# Patient Record
Sex: FEMALE | Race: BLACK OR AFRICAN AMERICAN
[De-identification: names, ages, dates, MRNs, and addresses within clinical notes are randomized per-mention and may not be internally consistent; named-entity substitution may affect disease eponyms.]

---

## 2020-11-16 ENCOUNTER — HOSPITAL ENCOUNTER (OUTPATIENT)
Dept: HOSPITAL 62 - CCL | Age: 40
Discharge: HOME | End: 2020-11-16
Attending: SURGERY
Payer: SELF-PAY

## 2020-11-16 VITALS — SYSTOLIC BLOOD PRESSURE: 142 MMHG | DIASTOLIC BLOOD PRESSURE: 96 MMHG

## 2020-11-16 DIAGNOSIS — Z03.818: ICD-10-CM

## 2020-11-16 DIAGNOSIS — C50.911: Primary | ICD-10-CM

## 2020-11-16 DIAGNOSIS — F17.200: ICD-10-CM

## 2020-11-16 DIAGNOSIS — Z80.6: ICD-10-CM

## 2020-11-16 LAB
ADD MANUAL DIFF: NO
BASOPHILS # BLD AUTO: 0 10^3/UL (ref 0–0.2)
BASOPHILS NFR BLD AUTO: 0.4 % (ref 0–2)
EOSINOPHIL # BLD AUTO: 0.1 10^3/UL (ref 0–0.6)
EOSINOPHIL NFR BLD AUTO: 3.7 % (ref 0–6)
ERYTHROCYTE [DISTWIDTH] IN BLOOD BY AUTOMATED COUNT: 14.1 % (ref 11.5–14)
HCT VFR BLD CALC: 37.8 % (ref 36–47)
HGB BLD-MCNC: 12.6 G/DL (ref 12–15.5)
LYMPHOCYTES # BLD AUTO: 1.2 10^3/UL (ref 0.5–4.7)
LYMPHOCYTES NFR BLD AUTO: 34.2 % (ref 13–45)
MCH RBC QN AUTO: 30.7 PG (ref 27–33.4)
MCHC RBC AUTO-ENTMCNC: 33.4 G/DL (ref 32–36)
MCV RBC AUTO: 92 FL (ref 80–97)
MONOCYTES # BLD AUTO: 0.3 10^3/UL (ref 0.1–1.4)
MONOCYTES NFR BLD AUTO: 8.5 % (ref 3–13)
NEUTROPHILS # BLD AUTO: 1.8 10^3/UL (ref 1.7–8.2)
NEUTS SEG NFR BLD AUTO: 53.2 % (ref 42–78)
PLATELET # BLD: 240 10^3/UL (ref 150–450)
RBC # BLD AUTO: 4.1 10^6/UL (ref 3.72–5.28)
TOTAL CELLS COUNTED % (AUTO): 100 %
WBC # BLD AUTO: 3.4 10^3/UL (ref 4–10.5)

## 2020-11-16 PROCEDURE — C9803 HOPD COVID-19 SPEC COLLECT: HCPCS

## 2020-11-16 PROCEDURE — C1752 CATH,HEMODIALYSIS,SHORT-TERM: HCPCS

## 2020-11-16 PROCEDURE — 84703 CHORIONIC GONADOTROPIN ASSAY: CPT

## 2020-11-16 PROCEDURE — 85025 COMPLETE CBC W/AUTO DIFF WBC: CPT

## 2020-11-16 PROCEDURE — 36561 INSERT TUNNELED CV CATH: CPT

## 2020-11-16 PROCEDURE — 87635 SARS-COV-2 COVID-19 AMP PRB: CPT

## 2020-11-16 PROCEDURE — 77001 FLUOROGUIDE FOR VEIN DEVICE: CPT

## 2020-11-16 PROCEDURE — 36415 COLL VENOUS BLD VENIPUNCTURE: CPT

## 2020-11-16 PROCEDURE — 76937 US GUIDE VASCULAR ACCESS: CPT

## 2020-11-16 NOTE — OPERATIVE REPORT
Operative Report


DATE OF SURGERY: 11/16/20


PREOPERATIVE DIAGNOSIS: Locally advanced right breast carcinoma


POSTOPERATIVE DIAGNOSIS: Same


OPERATION: 1.  Focused ultrasound left neck, with ultrasound directed insertion 

of Dhliqp-k-Lpga catheter, left internal jugular vein.  2.  Port placement to 

the left subclavian position.  3.  Interpretation of intraoperative fluoroscopy.


SURGEON: CHRISTY JENNINGS


ANESTHESIA: Moderate Sedation


TISSUE REMOVED OR ALTERED: None


COMPLICATIONS: 





None


ESTIMATED BLOOD LOSS: Scant


INTRAOPERATIVE FINDINGS: See below


PROCEDURE: 





Patient was taken from the ambulatory area to the main cardiac catheterization 

lab where she was placed in supine position arms tucked, left neck and chest 

wall prepped and draped in sterile fashion.





Surgical plan and surgical timeout were conducted.





Left neck was scanned with a variable frequency linear transducer.  Appropriate 

level of conscious sedation was induced.  There is significant for a patent, 

compressible left internal jugular vein.  Skin was anesthetized with 1% plain 

lidocaine, nick made with a 15 blade, micro needle and wire threaded into the 

left internal jugular vein without difficulty.





A suitable site for placement of the port was chosen left subclavian position.  

Skin was anesthetized with 1% plain lidocaine.  A 3 and half centimeter incision

was made, parallel to the left clavicle, subcutaneous pocket developed with 

electrocautery, and single lumen catheter trimmed to the appropriate length, 

tunnel between the 2 incisions, then attached to the port with the plastic 

retention ring.  The port was tucked into the left subclavian pocket.





Under fluoroscopic guidance, the microwire was switched over to a conventional 

guidewire, 0.030 inches.  The 8 Icelandic dilator introducer sheath was threaded 

over the wire, dilator and introducer removed, and free catheter fragment 

threaded into the left internal jugular vein.  The tip of the catheter was 

advanced to the right atrium, and the strip away sheath removed.  There was no 

kinking of the catheter at the neck.  The catheter was aspirated and flushed 

with heparinized saline using a Mckinney needle.





There is no mechanical bleeding.  Sponge and counts are correct.  All wounds 

closed with 3-0 Vicryl, benzoin and Steri-Strips.





Documentation of the left internal jugular vein by ultrasound, and completion 

fluoroscopy retained in the patient's record by the radiology services  team

## 2020-11-16 NOTE — DISCHARGE SUMMARY
Discharge Summary (SDC)





- Discharge


Final Diagnosis: 





Locally advanced right breast carcinoma


Date of Surgery: 11/16/20


Discharge Date: 11/16/20


Condition: Good


Treatment or Instructions: 


May use port; may shower in 48 hours; resume preoperative medications, diet, 

activity: Prescription added electronically; patient to follow-up with [place] 

surgical clinic in 2 weeks


Prescriptions: 


Ketorolac Tromethamine [Toradol 10 mg Tablet] 10 mg PO Q6HP PRN #14 tablet


 PRN Reason: 


Referrals: 


YENNY CHRISTIAN MD [Primary Care Provider] - 


Discharge Diet: As Tolerated


Discharge Activity: Activity As Tolerated


Home Care Assistance: None Needed


Report the Following to Your Physician Immediately: Shortness of Breath, 

Increase in Pain, Fever over 101 Degrees

## 2020-11-17 ENCOUNTER — HOSPITAL ENCOUNTER (OUTPATIENT)
Dept: HOSPITAL 62 - RAD | Age: 40
End: 2020-11-17
Attending: INTERNAL MEDICINE
Payer: SELF-PAY

## 2020-11-17 DIAGNOSIS — K71.0: Primary | ICD-10-CM

## 2020-11-17 DIAGNOSIS — C50.411: ICD-10-CM

## 2020-11-17 PROCEDURE — 78306 BONE IMAGING WHOLE BODY: CPT

## 2020-11-17 PROCEDURE — A9503 TC99M MEDRONATE: HCPCS

## 2020-11-17 PROCEDURE — 74177 CT ABD & PELVIS W/CONTRAST: CPT

## 2020-11-17 PROCEDURE — 71260 CT THORAX DX C+: CPT

## 2020-11-17 NOTE — RADIOLOGY REPORT (SQ)
EXAM DESCRIPTION:  CT CHEST WITH; CT ABD/PELVIS WITH IV   ORAL



IMAGES COMPLETED DATE/TIME:  11/17/2020 9:21 am



REASON FOR STUDY:  C50.411 MALIG NEOPLM OF UPPER-OUTER QUADRANT OF RIGHT FEMALE BREAST C50.411  MALIG
 NEOPLM OF UPPER-OUTER QUADRANT OF RIGHT FEMALE



CONTRAST TYPE AND DOSE:  contrast/concentration: Isovue 350.00 mmol/ml; Total Contrast Delivered: 100
.0 ml; Total Saline Delivered: 72.0 ml



RENAL FUNCTION:  None required. The patient is less than 50 years old.



COMPARISON:  None.



TECHNIQUE:  CT scan of the chest performed using helical scanning technique with dynamic intravenous 
contrast injection.  Images reviewed with lung, soft tissue and bone windows. Reconstructed coronal a
nd sagittal MPR images reviewed.  All images stored on PACS.

All CT scanners at this facility use dose modulation, iterative reconstruction, and/or weight based d
osing when appropriate to reduce radiation dose to as low as reasonably achievable (ALARA).

[facility]: Dose Right  [facility]: CareDose    [facility]: Dose Right    [facility]: Teradose 4D    [facility]: Smart Technologies



RADIATION DOSE:  CT Rad equipment meets quality standard of care and radiation dose reduction techniq
ues were employed. CTDIvol: 11.4 - 16.6 mGy. DLP: 1993 mGy-cm. .



LIMITATIONS:  None.



FINDINGS:  AXILLAE: No adenopathy.

CHEST WALL: No masses.  No subcutaneous air.

LUNGS: 50% left pneumothorax.  No infiltrate, effusion, or mass.

PLEURA: No effusions.  No calcifications.

THYROID: No masses or significant asymmetry.

HILAR AND MEDIASTINAL STRUCTURES: No identified masses or abnormal nodes.

AORTA AND GREAT VESSELS: No aneurysm.  No dissection.

PULMONARY ARTERIES: No identified pulmonary emboli.  Study not optimized for the pulmonary arteries.

HEART: No pericardial effusion.

HARDWARE AND LIFELINES: Injection port on the left.

BONES: No significant finding.

OTHER: No other significant finding.



IMPRESSION:  50% left pneumothorax.  No evidence of metastatic disease in the thorax.



COMPARISON:  None.



RADIATION DOSE:  CT Rad equipment meets quality standard of care and radiation dose reduction techniq
ues were employed. CTDIvol: 11.4 - 16.6 mGy. DLP: 1993 mGy-cm. mGy.



TECHNIQUE:  CT scan of the abdomen and pelvis performed with intravenous and oral contrast using heaven
rossana scanning technique with dynamic intravenous contrast injection.  Images reviewed with lung, soft 
tissue and bone windows.  Reconstructed coronal and sagittal MPR images reviewed.  Delayed images for
 evaluation of the urinary system also acquired and evaluated. All images stored on PACS.

All CT scanners at this facility use dose modulation, iterative reconstruction, and/or weight based d
osing when appropriate to reduce radiation dose to as low as reasonably achievable (ALARA).

[facility]: Dose Right  [facility]: SureCare    [facility]: Dose Right    [facility]: Teradose 4D    [facility]: Smart Technologies



FINDINGS:  LIVER: There is a 12 mm low-density lesion in the right lobe of the liver.  No other signi
ficant findings.

SPLEEN: Normal size.  No focal lesions.

PANCREAS: No masses.  No significant calcifications.  No adjacent inflammation or peripancreatic flui
d collections.  Pancreatic duct not dilated.

GALLBLADDER: No identified stones by CT criteria. No inflammatory changes to suggest cholecystitis.

ADRENAL GLANDS: No significant masses or asymmetry.

RIGHT KIDNEY AND URETER: No solid masses.   No significant calcifications.   No hydronephrosis or hyd
roureter.

LEFT KIDNEY AND URETER: No solid masses.   No significant calcifications.   No hydronephrosis or hydr
oureter.

AORTA AND VESSELS: No aneurysm. No dissection. Renal arteries, SMA, celiac without stenosis.

RETROPERITONEUM: No retroperitoneal adenopathy, hemorrhage or masses.

LARGE AND SMALL BOWEL: No dilatation.  No masses.  No wall thickening.

APPENDIX: Normal.

ABDOMINAL WALL: No hernia or masses.

PERITONEAL CAVITY: No free air.  No free fluid.  No peritoneal implants or masses.

PELVIS: No mass or free fluid.  Normal bladder.

BONES: No significant or acute findings.

OTHER: No other significant finding.



IMPRESSION:  There is no evidence of abdominal or pelvic metastases.  There is a low-density lesion i
n the right lobe of the liver that likely represents a cyst or hemangioma.



COMMENT:    Pertinent findings on the imaging study reported as a CRITICAL RESULT to YENNY CHRISTIAN MD 
at18:34 on 11/17/2020.

Category of Critical Result: Pneumothorax



TECHNICAL DOCUMENTATION:  JOB ID:  [id number]

Quality ID # 436: Final reports with documentation of one or more dose reduction techniques (e.g., Au
tomated exposure control, adjustment of the mA and/or kV according to patient size, use of iterative 
reconstruction technique)

 2011 [employer]- All Rights Reserved



Reading location - IP/workstation name: [email or web address]

## 2020-11-17 NOTE — RADIOLOGY REPORT (SQ)
EXAM DESCRIPTION:  PORTACATH INSERTION



IMAGES COMPLETED DATE/TIME:  11/16/2020 11:36 am



REASON FOR STUDY:  RT BREAST CA C50.911  MALIGNANT NEOPLASM OF UNSP SITE OF RIGHT FEMALE MICHAEL



COMPARISON:  None.



FLUOROSCOPY TIME:  Less than 0.1 minutes.

Cine fluoroscopic images saved to PACS.



TECHNIQUE:  Intra-operative images acquired during surgical procedure to evaluate progress.

NUMBER OF IMAGES: Cine fluoroscopic images.



LIMITATIONS:  None.



FINDINGS:  Images of the chest acquired during port placement.



IMPRESSION:  IMAGE(S) OBTAINED DURING PROCEDURE.



COMMENT:  Quality :  Final reports for procedures using fluoroscopy that document radiation exp
osure indices, or exposure time and number of fluorographic images (if radiation exposure indices are
 not available)

Please consult full operative report of the attending physician for description of the procedure.



TECHNICAL DOCUMENTATION:  JOB ID:  [id number]

 2011 [employer]- All Rights Reserved



Reading location - IP/workstation name: [email or web address]

## 2020-11-17 NOTE — RADIOLOGY REPORT (SQ)
EXAM DESCRIPTION:  CT CHEST WITH; CT ABD/PELVIS WITH IV   ORAL



IMAGES COMPLETED DATE/TIME:  11/17/2020 9:21 am



REASON FOR STUDY:  C50.411 MALIG NEOPLM OF UPPER-OUTER QUADRANT OF RIGHT FEMALE BREAST C50.411  MALIG
 NEOPLM OF UPPER-OUTER QUADRANT OF RIGHT FEMALE



CONTRAST TYPE AND DOSE:  contrast/concentration: Isovue 350.00 mmol/ml; Total Contrast Delivered: 100
.0 ml; Total Saline Delivered: 72.0 ml



RENAL FUNCTION:  None required. The patient is less than 50 years old.



COMPARISON:  None.



TECHNIQUE:  CT scan of the chest performed using helical scanning technique with dynamic intravenous 
contrast injection.  Images reviewed with lung, soft tissue and bone windows. Reconstructed coronal a
nd sagittal MPR images reviewed.  All images stored on PACS.

All CT scanners at this facility use dose modulation, iterative reconstruction, and/or weight based d
osing when appropriate to reduce radiation dose to as low as reasonably achievable (ALARA).

[facility]: Dose Right  [facility]: CareDose    [facility]: Dose Right    [facility]: Teradose 4D    [facility]: Smart Technologies



RADIATION DOSE:  CT Rad equipment meets quality standard of care and radiation dose reduction techniq
ues were employed. CTDIvol: 11.4 - 16.6 mGy. DLP: 1993 mGy-cm. .



LIMITATIONS:  None.



FINDINGS:  AXILLAE: No adenopathy.

CHEST WALL: No masses.  No subcutaneous air.

LUNGS: 50% left pneumothorax.  No infiltrate, effusion, or mass.

PLEURA: No effusions.  No calcifications.

THYROID: No masses or significant asymmetry.

HILAR AND MEDIASTINAL STRUCTURES: No identified masses or abnormal nodes.

AORTA AND GREAT VESSELS: No aneurysm.  No dissection.

PULMONARY ARTERIES: No identified pulmonary emboli.  Study not optimized for the pulmonary arteries.

HEART: No pericardial effusion.

HARDWARE AND LIFELINES: Injection port on the left.

BONES: No significant finding.

OTHER: No other significant finding.



IMPRESSION:  50% left pneumothorax.  No evidence of metastatic disease in the thorax.



COMPARISON:  None.



RADIATION DOSE:  CT Rad equipment meets quality standard of care and radiation dose reduction techniq
ues were employed. CTDIvol: 11.4 - 16.6 mGy. DLP: 1993 mGy-cm. mGy.



TECHNIQUE:  CT scan of the abdomen and pelvis performed with intravenous and oral contrast using heaven
rossana scanning technique with dynamic intravenous contrast injection.  Images reviewed with lung, soft 
tissue and bone windows.  Reconstructed coronal and sagittal MPR images reviewed.  Delayed images for
 evaluation of the urinary system also acquired and evaluated. All images stored on PACS.

All CT scanners at this facility use dose modulation, iterative reconstruction, and/or weight based d
osing when appropriate to reduce radiation dose to as low as reasonably achievable (ALARA).

[facility]: Dose Right  [facility]: SureCare    [facility]: Dose Right    [facility]: Teradose 4D    [facility]: Smart Technologies



FINDINGS:  LIVER: There is a 12 mm low-density lesion in the right lobe of the liver.  No other signi
ficant findings.

SPLEEN: Normal size.  No focal lesions.

PANCREAS: No masses.  No significant calcifications.  No adjacent inflammation or peripancreatic flui
d collections.  Pancreatic duct not dilated.

GALLBLADDER: No identified stones by CT criteria. No inflammatory changes to suggest cholecystitis.

ADRENAL GLANDS: No significant masses or asymmetry.

RIGHT KIDNEY AND URETER: No solid masses.   No significant calcifications.   No hydronephrosis or hyd
roureter.

LEFT KIDNEY AND URETER: No solid masses.   No significant calcifications.   No hydronephrosis or hydr
oureter.

AORTA AND VESSELS: No aneurysm. No dissection. Renal arteries, SMA, celiac without stenosis.

RETROPERITONEUM: No retroperitoneal adenopathy, hemorrhage or masses.

LARGE AND SMALL BOWEL: No dilatation.  No masses.  No wall thickening.

APPENDIX: Normal.

ABDOMINAL WALL: No hernia or masses.

PERITONEAL CAVITY: No free air.  No free fluid.  No peritoneal implants or masses.

PELVIS: No mass or free fluid.  Normal bladder.

BONES: No significant or acute findings.

OTHER: No other significant finding.



IMPRESSION:  There is no evidence of abdominal or pelvic metastases.  There is a low-density lesion i
n the right lobe of the liver that likely represents a cyst or hemangioma.



COMMENT:    Pertinent findings on the imaging study reported as a CRITICAL RESULT to YENNY CHRISTIAN MD 
at18:34 on 11/17/2020.

Category of Critical Result: Pneumothorax



TECHNICAL DOCUMENTATION:  JOB ID:  [id number]

Quality ID # 436: Final reports with documentation of one or more dose reduction techniques (e.g., Au
tomated exposure control, adjustment of the mA and/or kV according to patient size, use of iterative 
reconstruction technique)

 2011 [employer]- All Rights Reserved



Reading location - IP/workstation name: [email or web address]

## 2020-11-18 NOTE — RADIOLOGY REPORT (SQ)
EXAM DESCRIPTION:  NM WHOLE BODY BONE SCAN



IMAGES COMPLETED DATE/TIME:  11/17/2020 1:12 pm



REASON FOR STUDY:  C50.411 MALIG NEOPLM OF UPPER-OUTER QUADRANT OF RIGHT FEMALE BREAST C50.411  MALIG
 NEOPLM OF UPPER-OUTER QUADRANT OF RIGHT FEMALE



COMPARISON:  CT chest abdomen pelvis.



RADIONUCLIDE AND DOSE:  20 millicuries Tc99m MDP.

The route of agent administration: Intravenous.



ADDITIONAL DRUGS AND DOSES:  None.



TECHNIQUE:  Routine delayed images at 3 hours post radionuclide injection acquired of the bony skelet
on including anterior and posterior whole-body projections and additional focused images as needed.



LIMITATIONS:  None.



FINDINGS:  BONES: There is focal uptake in the right maxilla and left mandible, likely dental disease
.  The overall appearance of the scan does not suggest metastatic disease to bone.

KIDNEYS: Symmetric excretion without obstruction.

OTHER: No other significant finding.



IMPRESSION:  The overall appearance of the scan does not suggest metastatic disease to bone.



COMMENT:  Quality measure 147:  Current bone scan is compared with any available plain radiographs, p
rior bone scans, and CT/MRI.



TECHNICAL DOCUMENTATION:  JOB ID:  [id number]

 2011 [employer]- All Rights Reserved



Reading location - IP/workstation name: [email or web address]

## 2020-11-20 ENCOUNTER — HOSPITAL ENCOUNTER (OUTPATIENT)
Dept: HOSPITAL 62 - RAD | Age: 40
End: 2020-11-20
Attending: INTERNAL MEDICINE
Payer: MEDICAID

## 2020-11-20 DIAGNOSIS — Z08: Primary | ICD-10-CM

## 2020-11-20 DIAGNOSIS — Z01.89: ICD-10-CM

## 2020-11-20 DIAGNOSIS — C50.411: ICD-10-CM

## 2020-11-20 PROCEDURE — 78472 GATED HEART PLANAR SINGLE: CPT

## 2020-11-20 PROCEDURE — A9560 TC99M LABELED RBC: HCPCS

## 2020-11-20 NOTE — RADIOLOGY REPORT (SQ)
EXAM DESCRIPTION:  NM MUGA REST



IMAGES COMPLETED DATE/TIME:  11/20/2020 11:59 am



REASON FOR STUDY:  Z08 ENCOUNTER FOR FOLLOW-UP EXAMINATION AFTER COMPLETED TREATMENT FOR MALIG Z08  E
NCNTR FOR FOLLOW-UP EXAM AFTER TRTMT FOR MALIGNANT NEOP Z01.89  ENCOUNTER FOR OTHER SPECIFIED SPECIAL
 EXAMINATIONS C50.411  MALIG NEOPLM OF UPPER-OUTER QUADRANT OF RIGHT FEMALE



COMPARISON:  None.



RADIONUCLIDE AND DOSE:  26.3 mCi technetium 99m labeled red blood cells

The route of agent administration: Intravenous



TECHNIQUE:  Following administration of the radionuclide, gated images of the heart are obtained in t
hree projections.  Left ventricular functional analysis performed.



LIMITATIONS:  None.



FINDINGS:   LEFT VENTRICULAR FUNCTION:

EJECTION FRACTION: 72%.

END-DIASTOLIC VOLUME: 110 mL.

END-SYSTOLIC VOLUME: 38 mL.

WALL MOTION: No focal wall motion abnormalities.

OTHER: No other significant finding.



IMPRESSION:  NORMAL CARDIAC MUGA STUDY.  NORMAL LEFT VENTRICULAR FUNCTION WITH VALUES AS ABOVE.



TECHNICAL DOCUMENTATION:  JOB ID:  [id number]

 2011 [employer]- All Rights Reserved



Reading location - IP/workstation name: [email or web address]

## 2020-11-23 ENCOUNTER — HOSPITAL ENCOUNTER (OUTPATIENT)
Dept: HOSPITAL 62 - RAD | Age: 40
End: 2020-11-23
Attending: SURGERY
Payer: MEDICAID

## 2020-11-23 DIAGNOSIS — J93.9: Primary | ICD-10-CM

## 2020-11-23 PROCEDURE — 71046 X-RAY EXAM CHEST 2 VIEWS: CPT

## 2020-11-23 NOTE — RADIOLOGY REPORT (SQ)
EXAM DESCRIPTION:  CHEST 2 VIEWS



IMAGES COMPLETED DATE/TIME:  11/23/2020 2:29 pm



REASON FOR STUDY:  PNEUMOTHORAX, UNSPECIFIED



COMPARISON:  CT chest dated 11/17/2020



EXAM PARAMETERS:  NUMBER OF VIEWS: two views

TECHNIQUE: Digital Frontal and Lateral radiographic views of the chest acquired.

RADIATION DOSE: NA

LIMITATIONS: none



FINDINGS:  LUNGS AND PLEURA: Persistent left apical pneumothorax.  Largest diameter is 2.5 cm.  No me
diastinal shift.  No consolidation.

MEDIASTINUM AND HILAR STRUCTURES: No masses or contour abnormalities.

HEART AND VASCULAR STRUCTURES: Heart normal size.  No evidence for failure.

BONES: No acute findings.

HARDWARE: [id number] is in place.

OTHER: No other significant finding.



IMPRESSION:  Persistent left apical pneumothorax.  Largest diameter is 2.5 cm on today's chest x-ray.




TECHNICAL DOCUMENTATION:  JOB ID:  [id number]

 2011 [employer]- All Rights Reserved



Reading location - IP/workstation name: [email or web address]

## 2020-12-01 ENCOUNTER — HOSPITAL ENCOUNTER (OUTPATIENT)
Dept: HOSPITAL 62 - RAD | Age: 40
End: 2020-12-01
Attending: SURGERY
Payer: SELF-PAY

## 2020-12-01 DIAGNOSIS — J93.83: Primary | ICD-10-CM

## 2020-12-01 PROCEDURE — 71046 X-RAY EXAM CHEST 2 VIEWS: CPT

## 2020-12-01 NOTE — RADIOLOGY REPORT (SQ)
EXAM DESCRIPTION:  CHEST 2 VIEWS



IMAGES COMPLETED DATE/TIME:  12/1/2020 2:30 pm



REASON FOR STUDY:  J93.83 OTHER PNEUMOTHORAX J93.83  OTHER PNEUMOTHORAX



COMPARISON:  11/23/2020



NUMBER OF VIEWS:  Two view



TECHNIQUE:  Frontal and lateral radiographic images of the chest acquired.



LIMITATIONS:  None.



FINDINGS:  LUNGS AND PLEURA: Decrease in size of left apical pneumothorax now less than 10%.

MEDIASTINUM AND HILAR STRUCTURES: Stable heart size and mediastinal structures.

HEART AND VASCULAR STRUCTURES: Stable appearance.

SUPPORT DEVICES: Appropriate location without change.

BONES: No acute findings.

OTHER: No other significant finding.



IMPRESSION:  Decrease in size of left apical pneumothorax.



TECHNICAL DOCUMENTATION:  JOB ID:  [id number]

 2011 [employer]- All Rights Reserved



Reading location - IP/workstation name: [email or web address]

## 2020-12-15 ENCOUNTER — HOSPITAL ENCOUNTER (OUTPATIENT)
Dept: HOSPITAL 62 - II | Age: 40
Discharge: HOME | End: 2020-12-15
Attending: INTERNAL MEDICINE
Payer: COMMERCIAL

## 2020-12-15 VITALS — SYSTOLIC BLOOD PRESSURE: 129 MMHG | DIASTOLIC BLOOD PRESSURE: 77 MMHG

## 2020-12-15 DIAGNOSIS — Z51.11: Primary | ICD-10-CM

## 2020-12-15 DIAGNOSIS — C50.411: ICD-10-CM

## 2020-12-15 PROCEDURE — 96413 CHEMO IV INFUSION 1 HR: CPT

## 2020-12-15 PROCEDURE — 96417 CHEMO IV INFUS EACH ADDL SEQ: CPT

## 2020-12-15 PROCEDURE — 96367 TX/PROPH/DG ADDL SEQ IV INF: CPT

## 2020-12-15 PROCEDURE — S0028 INJECTION, FAMOTIDINE, 20 MG: HCPCS

## 2020-12-23 ENCOUNTER — HOSPITAL ENCOUNTER (OUTPATIENT)
Dept: HOSPITAL 62 - II | Age: 40
Discharge: HOME | End: 2020-12-23
Attending: PHYSICIAN ASSISTANT
Payer: COMMERCIAL

## 2020-12-23 VITALS — SYSTOLIC BLOOD PRESSURE: 101 MMHG | DIASTOLIC BLOOD PRESSURE: 70 MMHG

## 2020-12-23 DIAGNOSIS — E86.0: Primary | ICD-10-CM

## 2020-12-23 DIAGNOSIS — C50.411: ICD-10-CM

## 2020-12-23 PROCEDURE — 83735 ASSAY OF MAGNESIUM: CPT

## 2020-12-23 PROCEDURE — 36415 COLL VENOUS BLD VENIPUNCTURE: CPT

## 2020-12-23 PROCEDURE — 96361 HYDRATE IV INFUSION ADD-ON: CPT

## 2020-12-23 PROCEDURE — 96374 THER/PROPH/DIAG INJ IV PUSH: CPT

## 2020-12-25 ENCOUNTER — HOSPITAL ENCOUNTER (EMERGENCY)
Dept: HOSPITAL 62 - ER | Age: 40
Discharge: HOME | End: 2020-12-25
Payer: COMMERCIAL

## 2020-12-25 VITALS — SYSTOLIC BLOOD PRESSURE: 140 MMHG | DIASTOLIC BLOOD PRESSURE: 84 MMHG

## 2020-12-25 DIAGNOSIS — T45.1X5A: ICD-10-CM

## 2020-12-25 DIAGNOSIS — C50.919: ICD-10-CM

## 2020-12-25 DIAGNOSIS — R53.83: ICD-10-CM

## 2020-12-25 DIAGNOSIS — R30.0: ICD-10-CM

## 2020-12-25 DIAGNOSIS — R53.1: ICD-10-CM

## 2020-12-25 DIAGNOSIS — M79.10: ICD-10-CM

## 2020-12-25 DIAGNOSIS — Z87.891: ICD-10-CM

## 2020-12-25 DIAGNOSIS — Z20.828: ICD-10-CM

## 2020-12-25 DIAGNOSIS — D70.1: Primary | ICD-10-CM

## 2020-12-25 LAB
ADD MANUAL DIFF: (no result)
ALBUMIN SERPL-MCNC: 3.9 G/DL (ref 3.5–5)
ALP SERPL-CCNC: 79 U/L (ref 38–126)
ANION GAP SERPL CALC-SCNC: 3 MMOL/L (ref 5–19)
APPEARANCE UR: (no result)
APTT PPP: YELLOW S
AST SERPL-CCNC: 28 U/L (ref 14–36)
BASOPHILS # BLD AUTO: 0 10^3/UL (ref 0–0.2)
BASOPHILS NFR BLD AUTO: 0.9 % (ref 0–2)
BILIRUB DIRECT SERPL-MCNC: 0.2 MG/DL (ref 0–0.4)
BILIRUB SERPL-MCNC: 0.5 MG/DL (ref 0.2–1.3)
BILIRUB UR QL STRIP: NEGATIVE
BUN SERPL-MCNC: 7 MG/DL (ref 7–20)
CALCIUM: 9.3 MG/DL (ref 8.4–10.2)
CHLORIDE SERPL-SCNC: 103 MMOL/L (ref 98–107)
CK SERPL-CCNC: 53 U/L (ref 30–135)
CO2 SERPL-SCNC: 29 MMOL/L (ref 22–30)
EOSINOPHIL # BLD AUTO: 0 10^3/UL (ref 0–0.6)
EOSINOPHIL NFR BLD AUTO: 1 % (ref 0–6)
ERYTHROCYTE [DISTWIDTH] IN BLOOD BY AUTOMATED COUNT: 13.9 % (ref 11.5–14)
GLUCOSE SERPL-MCNC: 92 MG/DL (ref 75–110)
GLUCOSE UR STRIP-MCNC: NEGATIVE MG/DL
HCT VFR BLD CALC: 34.2 % (ref 36–47)
HGB BLD-MCNC: 11.6 G/DL (ref 12–15.5)
INR PPP: 0.96
KETONES UR STRIP-MCNC: NEGATIVE MG/DL
LYMPHOCYTES # BLD AUTO: 0.7 10^3/UL (ref 0.5–4.7)
LYMPHOCYTES NFR BLD AUTO: 76.6 % (ref 13–45)
MCH RBC QN AUTO: 30.5 PG (ref 27–33.4)
MCHC RBC AUTO-ENTMCNC: 34 G/DL (ref 32–36)
MCV RBC AUTO: 90 FL (ref 80–97)
MONOCYTES # BLD AUTO: 0.2 10^3/UL (ref 0.1–1.4)
MONOCYTES NFR BLD AUTO: 20.6 % (ref 3–13)
NEUTROPHILS # BLD AUTO: 0 10^3/UL (ref 1.7–8.2)
NEUTS SEG NFR BLD AUTO: 0.9 % (ref 42–78)
NITRITE UR QL STRIP: NEGATIVE
PH UR STRIP: 6 [PH] (ref 5–9)
PLATELET # BLD: 115 10^3/UL (ref 150–450)
PLATELET COMMENT: (no result)
POTASSIUM SERPL-SCNC: 4.1 MMOL/L (ref 3.6–5)
PROT SERPL-MCNC: 7.1 G/DL (ref 6.3–8.2)
PROT UR STRIP-MCNC: 30 MG/DL
PROTHROMBIN TIME: 13 SEC (ref 11.4–15.4)
RBC # BLD AUTO: 3.81 10^6/UL (ref 3.72–5.28)
RBC MORPH BLD: (no result)
SP GR UR STRIP: 1.03
TOTAL CELLS COUNTED % (AUTO): 100 %
UROBILINOGEN UR-MCNC: NEGATIVE MG/DL (ref ?–2)
WBC # BLD AUTO: 0.9 10^3/UL (ref 4–10.5)

## 2020-12-25 PROCEDURE — 36415 COLL VENOUS BLD VENIPUNCTURE: CPT

## 2020-12-25 PROCEDURE — 99284 EMERGENCY DEPT VISIT MOD MDM: CPT

## 2020-12-25 PROCEDURE — 87086 URINE CULTURE/COLONY COUNT: CPT

## 2020-12-25 PROCEDURE — 96374 THER/PROPH/DIAG INJ IV PUSH: CPT

## 2020-12-25 PROCEDURE — 85025 COMPLETE CBC W/AUTO DIFF WBC: CPT

## 2020-12-25 PROCEDURE — 96361 HYDRATE IV INFUSION ADD-ON: CPT

## 2020-12-25 PROCEDURE — 81001 URINALYSIS AUTO W/SCOPE: CPT

## 2020-12-25 PROCEDURE — 82550 ASSAY OF CK (CPK): CPT

## 2020-12-25 PROCEDURE — 87040 BLOOD CULTURE FOR BACTERIA: CPT

## 2020-12-25 PROCEDURE — 85610 PROTHROMBIN TIME: CPT

## 2020-12-25 PROCEDURE — 96375 TX/PRO/DX INJ NEW DRUG ADDON: CPT

## 2020-12-25 PROCEDURE — 87088 URINE BACTERIA CULTURE: CPT

## 2020-12-25 PROCEDURE — 83605 ASSAY OF LACTIC ACID: CPT

## 2020-12-25 PROCEDURE — 80053 COMPREHEN METABOLIC PANEL: CPT

## 2020-12-25 NOTE — ER DOCUMENT REPORT
ED General





- General


Chief Complaint: Weakness


Stated Complaint: WEAKNESS,PAIN,HAS PORT IN


Time Seen by Provider: 12/25/20 12:55


Primary Care Provider: 


YENNY CHRISTIAN MD [Primary Care Provider] - Follow up as needed


Notes: 





CHIEF COMPLAINT: Weakness, fatigue, myalgia





HPI: 40-year-old female who is a breast cancer patient of Dr. Tomlinson presenting 

for weakness fatigue and myalgia.  Patient had chemotherapy for the first time 

on December 15.  Patient has been complaining about myalgia fatigue and weakness

over the last 10 days, states she went to the office 4 days ago and was told her

blood counts were low.  Patient states that they put her on Percocet 10 at home 

for the body ache but it has not helped.  No fever.  She does report dysuria 

today.  No abdominal pain no vomiting no chest pain no shortness of breath





ROS: See HPI - all other systems were reviewed and are otherwise negative


Constitutional: no fever 


Eyes: no drainage, no blurred vision


ENT: no runny nose, no sore throat


Cardiovascular:  no chest pain 


Resp: no SOB, no cough


GI: no vomiting, no diarrhea, no abdominal pain


: Positive dysuria


Integumentary: no rash 


Allergy: no hives 


Musculoskeletal: Positive myalgia


Neurological: no numbness/tingling, positive generalized weakness





MEDICATIONS: I agree with the patient medications as charted by the RN.





ALLERGIES: I agree with the allergies as charted by the RN.





PAST MEDICAL HISTORY/PAST SURGICAL HISTORY: Reviewed and agree as charted by RN.





SOCIAL HISTORY: Reviewed and agree as charted by RN.





FAMILY HISTORY: No significant familial comorbid conditions directly related to 

patient complaint





EXAM:


Reviewed vital signs as charted by RN.


CONSTITUTIONAL: Alert and oriented and responds appropriately to questions. 

Well-appearing; well-nourished


HEAD: Normocephalic; atraumatic


EYES: PERRL; Conjunctivae clear, sclerae non-icteric


ENT: normal nose; no rhinorrhea; moist mucous membranes; pharynx without lesions

noted, no uvula edema or deviation, no tonsillar hypertrophy, phonation normal


NECK: Supple without meningismus; non-tender; no cervical lymphadenopathy, no 

masses


CARD: Mild tachycardia; no murmurs, no clicks, no rubs, no gallops; symmetric 

distal pulses


RESP: Normal chest excursion without splinting or tachypnea; breath sounds clear

and equal bilaterally; no wheezes, no rhonchi, no rales, pulse oximetry 97% on 

room air not hypoxic


ABD/GI: Normal bowel sounds; non-distended; soft, non-tender, no rebound, no 

guarding; no palpable organomegaly or masses.


BACK:  The back appears normal and is non-tender to palpation, there is no CVA 

tenderness


EXT: Normal ROM in all joints; mild generalized myalgia is noted; no cyanosis, 

no effusions, no edema   


SKIN: Normal color for age and race; warm; dry; good turgor; no acute lesions 

noted


NEURO: Moves all extremities equally; Motor and sensory function intact 


PSYCH: The patient's mood and manner are appropriate. Grooming and personal 

hygiene are appropriate.





MDM: 40-year-old female breast cancer patient of Dr. Tomlinson had chemotherapy 10 

days ago with body ache and myalgia.  She believes her blood counts are also l

ow.  States she did have a nosebleed several days ago none today.  No chest pain

no shortness of breath no abdominal pain does report dysuria.  Will obtain 

baseline screening labs hydrate patient treat pain discussed with hematology





The patient was evaluated during the global COVID-19 pandemic and that diagnosis

was suspected/considered upon their initial presentation.  Their evaluation, 

treatment and testing was consistent with current guidelines for patients who 

present with complaints or symptoms that may be related to COVID-19





- Related Data


Allergies/Adverse Reactions: 


                                        





No Known Allergies Allergy (Unverified 12/15/20 08:39)


   











Past Medical History





- Social History


Smoking Status: Former Smoker


Family History: Reviewed & Not Pertinent





- Past Medical History


Cardiac Medical History: 


   Denies: Hx Coronary Artery Disease, Hx Heart Attack, Hx Hypertension


Pulmonary Medical History: 


   Denies: Hx Asthma, Hx Bronchitis, Hx COPD, Hx Pneumonia


Neurological Medical History: Denies: Hx Cerebrovascular Accident, Hx Seizures


Musculoskeletal Medical History: Denies Hx Arthritis





Physical Exam





- Vital signs


Vitals: 


                                        











Temp Pulse Resp BP Pulse Ox


 


 98.4 F   116 H  20   131/86 H  97 


 


 12/25/20 12:01  12/25/20 12:01  12/25/20 12:01  12/25/20 12:01  12/25/20 12:01














Course





- Re-evaluation


Re-evalutation: 





12/25/20 14:59


Patient is noted to be neutropenic.  I spoke with Dr. Stover, oncology.  He 

recommends Levaquin 500 mg once daily for 7 days.  He recommends placing patient

on hydrocodone for pain.  They will follow the patient in the office.  I spoke 

with the patient about this at length she is comfortable with this plan





- Vital Signs


Vital signs: 


                                        











Temp Pulse Resp BP Pulse Ox


 


 98.4 F   99   18   131/61 H  99 


 


 12/25/20 12:01  12/25/20 12:58  12/25/20 12:58  12/25/20 12:58  12/25/20 12:58














- Laboratory Results


Result Diagrams: 


                                 12/25/20 13:26





                                 12/25/20 13:26


Laboratory Results Interpreted: 


                                        











  12/25/20 12/25/20 12/25/20





  13:26 13:26 13:26


 


WBC  0.9 L*  


 


Hgb  11.6 L  


 


Hct  34.2 L  


 


Plt Count  115 L  


 


Lymph % (Auto)  76.6 H  


 


Mono % (Auto)  20.6 H  


 


Absolute Neuts (auto)  0.0 L  


 


Seg Neutrophils %  0.9 L  


 


Sodium   135.4 L 


 


Anion Gap   3 L 


 


Lactic Acid    0.6 L


 


Urine Protein   














  12/25/20





  13:26


 


WBC 


 


Hgb 


 


Hct 


 


Plt Count 


 


Lymph % (Auto) 


 


Mono % (Auto) 


 


Absolute Neuts (auto) 


 


Seg Neutrophils % 


 


Sodium 


 


Anion Gap 


 


Lactic Acid 


 


Urine Protein  30 H











Critical Laboratory Results Reviewed: Yes


Attending or Supervising Physician who Reviewed Labs: JEREMY SAMS 

hematology





- Radiology Results


Critical Radiology Results Reviewed: No Critical Results





Discharge





- Discharge


Clinical Impression: 


 Myalgia





Neutropenia


Qualifiers:


 Neutropenia type: secondary to cancer chemotherapy Qualified Code(s): D70.1 - 

Agranulocytosis secondary to cancer chemotherapy; T45.1X5A - Adverse effect of 

antineoplastic and immunosuppressive drugs, initial encounter





Condition: Stable


Disposition: HOME, SELF-CARE


Additional Instructions: 


Take the Levaquin as prescribed.  Take the hydrocodone for pain.  Follow-up 

through your hematology office for further pain management and evaluation return

for onset of fever greater than 101


Prescriptions: 


Hydrocodone/Acetaminophen [Norco 5-325 mg Tablet] 1 tab PO Q4HP PRN #10 tablet


 PRN Reason: 


Levofloxacin [Levaquin 500 mg Tablet] 500 mg PO DAILY #7 tablet


Referrals: 


YENNY CHRISTIAN MD [Primary Care Provider] - Follow up as needed

## 2020-12-28 LAB — PATH REV BLD -IMP: (no result)

## 2020-12-29 ENCOUNTER — HOSPITAL ENCOUNTER (OUTPATIENT)
Dept: HOSPITAL 62 - II | Age: 40
Discharge: HOME | End: 2020-12-29
Attending: PHYSICIAN ASSISTANT
Payer: COMMERCIAL

## 2020-12-29 VITALS — DIASTOLIC BLOOD PRESSURE: 81 MMHG | SYSTOLIC BLOOD PRESSURE: 122 MMHG

## 2020-12-29 DIAGNOSIS — C50.411: Primary | ICD-10-CM

## 2020-12-29 DIAGNOSIS — E86.0: ICD-10-CM

## 2020-12-29 PROCEDURE — 96360 HYDRATION IV INFUSION INIT: CPT

## 2020-12-31 ENCOUNTER — HOSPITAL ENCOUNTER (OUTPATIENT)
Dept: HOSPITAL 62 - II | Age: 40
Discharge: HOME | End: 2020-12-31
Attending: PHYSICIAN ASSISTANT
Payer: COMMERCIAL

## 2020-12-31 VITALS — DIASTOLIC BLOOD PRESSURE: 79 MMHG | SYSTOLIC BLOOD PRESSURE: 131 MMHG

## 2020-12-31 DIAGNOSIS — E86.0: Primary | ICD-10-CM

## 2020-12-31 DIAGNOSIS — C50.411: ICD-10-CM

## 2020-12-31 PROCEDURE — 96360 HYDRATION IV INFUSION INIT: CPT

## 2021-01-05 ENCOUNTER — HOSPITAL ENCOUNTER (OUTPATIENT)
Dept: HOSPITAL 62 - II | Age: 41
Discharge: HOME | End: 2021-01-05
Attending: INTERNAL MEDICINE
Payer: COMMERCIAL

## 2021-01-05 VITALS — DIASTOLIC BLOOD PRESSURE: 82 MMHG | SYSTOLIC BLOOD PRESSURE: 127 MMHG

## 2021-01-05 DIAGNOSIS — C50.411: ICD-10-CM

## 2021-01-05 DIAGNOSIS — Z51.11: Primary | ICD-10-CM

## 2021-01-05 PROCEDURE — 96367 TX/PROPH/DG ADDL SEQ IV INF: CPT

## 2021-01-05 PROCEDURE — S0028 INJECTION, FAMOTIDINE, 20 MG: HCPCS

## 2021-01-05 PROCEDURE — 96415 CHEMO IV INFUSION ADDL HR: CPT

## 2021-01-05 PROCEDURE — 96413 CHEMO IV INFUSION 1 HR: CPT

## 2021-01-05 PROCEDURE — 96417 CHEMO IV INFUS EACH ADDL SEQ: CPT

## 2021-01-05 RX ADMIN — Medication PRN UNIT: at 10:03

## 2021-01-05 RX ADMIN — Medication PRN UNIT: at 09:59

## 2021-01-06 ENCOUNTER — HOSPITAL ENCOUNTER (OUTPATIENT)
Dept: HOSPITAL 62 - II | Age: 41
Discharge: HOME | End: 2021-01-06
Attending: INTERNAL MEDICINE
Payer: COMMERCIAL

## 2021-01-06 VITALS — SYSTOLIC BLOOD PRESSURE: 131 MMHG | DIASTOLIC BLOOD PRESSURE: 87 MMHG

## 2021-01-06 DIAGNOSIS — C50.411: ICD-10-CM

## 2021-01-06 DIAGNOSIS — D70.1: ICD-10-CM

## 2021-01-06 DIAGNOSIS — Z76.89: Primary | ICD-10-CM

## 2021-01-06 PROCEDURE — 96372 THER/PROPH/DIAG INJ SC/IM: CPT

## 2021-01-08 ENCOUNTER — HOSPITAL ENCOUNTER (OUTPATIENT)
Dept: HOSPITAL 62 - II | Age: 41
Discharge: HOME | End: 2021-01-08
Attending: INTERNAL MEDICINE
Payer: COMMERCIAL

## 2021-01-08 VITALS — DIASTOLIC BLOOD PRESSURE: 60 MMHG | SYSTOLIC BLOOD PRESSURE: 108 MMHG

## 2021-01-08 DIAGNOSIS — E86.0: Primary | ICD-10-CM

## 2021-01-08 DIAGNOSIS — C50.411: ICD-10-CM

## 2021-01-08 DIAGNOSIS — R11.0: ICD-10-CM

## 2021-01-08 LAB
ALBUMIN SERPL-MCNC: 3.9 G/DL (ref 3.5–5)
ALP SERPL-CCNC: 95 U/L (ref 38–126)
ANION GAP SERPL CALC-SCNC: 6 MMOL/L (ref 5–19)
AST SERPL-CCNC: 42 U/L (ref 14–36)
BILIRUB DIRECT SERPL-MCNC: 0.2 MG/DL (ref 0–0.4)
BILIRUB SERPL-MCNC: 0.4 MG/DL (ref 0.2–1.3)
BUN SERPL-MCNC: 12 MG/DL (ref 7–20)
CALCIUM: 9 MG/DL (ref 8.4–10.2)
CHLORIDE SERPL-SCNC: 99 MMOL/L (ref 98–107)
CO2 SERPL-SCNC: 32 MMOL/L (ref 22–30)
GLUCOSE SERPL-MCNC: 110 MG/DL (ref 75–110)
PHOSPHATE SERPL-MCNC: 4 MG/DL (ref 2.5–4.5)
POTASSIUM SERPL-SCNC: 3.8 MMOL/L (ref 3.6–5)
PROT SERPL-MCNC: 7 G/DL (ref 6.3–8.2)

## 2021-01-08 PROCEDURE — 80053 COMPREHEN METABOLIC PANEL: CPT

## 2021-01-08 PROCEDURE — 96361 HYDRATE IV INFUSION ADD-ON: CPT

## 2021-01-08 PROCEDURE — 84100 ASSAY OF PHOSPHORUS: CPT

## 2021-01-08 PROCEDURE — 87086 URINE CULTURE/COLONY COUNT: CPT

## 2021-01-08 PROCEDURE — 96365 THER/PROPH/DIAG IV INF INIT: CPT

## 2021-01-12 ENCOUNTER — HOSPITAL ENCOUNTER (OUTPATIENT)
Dept: HOSPITAL 62 - II | Age: 41
Discharge: HOME | End: 2021-01-12
Attending: INTERNAL MEDICINE
Payer: COMMERCIAL

## 2021-01-12 VITALS — DIASTOLIC BLOOD PRESSURE: 81 MMHG | SYSTOLIC BLOOD PRESSURE: 124 MMHG

## 2021-01-12 DIAGNOSIS — E86.0: Primary | ICD-10-CM

## 2021-01-12 DIAGNOSIS — C50.411: ICD-10-CM

## 2021-01-12 PROCEDURE — 96360 HYDRATION IV INFUSION INIT: CPT

## 2021-01-13 ENCOUNTER — HOSPITAL ENCOUNTER (OUTPATIENT)
Dept: HOSPITAL 62 - II | Age: 41
Discharge: HOME | End: 2021-01-13
Attending: INTERNAL MEDICINE
Payer: COMMERCIAL

## 2021-01-13 VITALS — SYSTOLIC BLOOD PRESSURE: 120 MMHG | DIASTOLIC BLOOD PRESSURE: 76 MMHG

## 2021-01-13 DIAGNOSIS — E86.0: Primary | ICD-10-CM

## 2021-01-13 DIAGNOSIS — C50.411: ICD-10-CM

## 2021-01-13 PROCEDURE — 96360 HYDRATION IV INFUSION INIT: CPT

## 2021-01-14 ENCOUNTER — HOSPITAL ENCOUNTER (OUTPATIENT)
Dept: HOSPITAL 62 - II | Age: 41
Discharge: HOME | End: 2021-01-14
Attending: INTERNAL MEDICINE
Payer: COMMERCIAL

## 2021-01-14 VITALS — SYSTOLIC BLOOD PRESSURE: 130 MMHG | DIASTOLIC BLOOD PRESSURE: 83 MMHG

## 2021-01-14 DIAGNOSIS — E86.0: Primary | ICD-10-CM

## 2021-01-14 DIAGNOSIS — C50.411: ICD-10-CM

## 2021-01-14 PROCEDURE — 96360 HYDRATION IV INFUSION INIT: CPT

## 2021-01-15 ENCOUNTER — HOSPITAL ENCOUNTER (OUTPATIENT)
Dept: HOSPITAL 62 - II | Age: 41
Discharge: HOME | End: 2021-01-15
Attending: INTERNAL MEDICINE
Payer: COMMERCIAL

## 2021-01-15 VITALS — SYSTOLIC BLOOD PRESSURE: 134 MMHG | DIASTOLIC BLOOD PRESSURE: 89 MMHG

## 2021-01-15 DIAGNOSIS — E86.0: Primary | ICD-10-CM

## 2021-01-15 DIAGNOSIS — C50.411: ICD-10-CM

## 2021-01-15 PROCEDURE — 96360 HYDRATION IV INFUSION INIT: CPT

## 2021-01-19 ENCOUNTER — HOSPITAL ENCOUNTER (OUTPATIENT)
Dept: HOSPITAL 62 - II | Age: 41
Discharge: HOME | End: 2021-01-19
Attending: INTERNAL MEDICINE
Payer: COMMERCIAL

## 2021-01-19 VITALS — SYSTOLIC BLOOD PRESSURE: 128 MMHG | DIASTOLIC BLOOD PRESSURE: 81 MMHG

## 2021-01-19 DIAGNOSIS — E86.0: Primary | ICD-10-CM

## 2021-01-19 DIAGNOSIS — C50.411: ICD-10-CM

## 2021-01-19 PROCEDURE — 96360 HYDRATION IV INFUSION INIT: CPT

## 2021-01-21 ENCOUNTER — HOSPITAL ENCOUNTER (OUTPATIENT)
Dept: HOSPITAL 62 - II | Age: 41
Discharge: HOME | End: 2021-01-21
Attending: INTERNAL MEDICINE
Payer: COMMERCIAL

## 2021-01-21 VITALS — DIASTOLIC BLOOD PRESSURE: 76 MMHG | SYSTOLIC BLOOD PRESSURE: 135 MMHG

## 2021-01-21 DIAGNOSIS — C50.411: ICD-10-CM

## 2021-01-21 DIAGNOSIS — E86.0: Primary | ICD-10-CM

## 2021-01-21 PROCEDURE — 96360 HYDRATION IV INFUSION INIT: CPT

## 2021-01-28 ENCOUNTER — HOSPITAL ENCOUNTER (OUTPATIENT)
Dept: HOSPITAL 62 - II | Age: 41
Discharge: HOME | End: 2021-01-28
Attending: INTERNAL MEDICINE
Payer: COMMERCIAL

## 2021-01-28 VITALS — SYSTOLIC BLOOD PRESSURE: 138 MMHG | DIASTOLIC BLOOD PRESSURE: 83 MMHG

## 2021-01-28 DIAGNOSIS — Z51.11: Primary | ICD-10-CM

## 2021-01-28 DIAGNOSIS — C50.411: ICD-10-CM

## 2021-01-28 PROCEDURE — 96413 CHEMO IV INFUSION 1 HR: CPT

## 2021-01-28 PROCEDURE — S0028 INJECTION, FAMOTIDINE, 20 MG: HCPCS

## 2021-01-28 PROCEDURE — 96417 CHEMO IV INFUS EACH ADDL SEQ: CPT

## 2021-01-28 PROCEDURE — 96367 TX/PROPH/DG ADDL SEQ IV INF: CPT

## 2021-01-29 ENCOUNTER — HOSPITAL ENCOUNTER (OUTPATIENT)
Dept: HOSPITAL 62 - II | Age: 41
Discharge: HOME | End: 2021-01-29
Attending: INTERNAL MEDICINE
Payer: COMMERCIAL

## 2021-01-29 VITALS — SYSTOLIC BLOOD PRESSURE: 143 MMHG | DIASTOLIC BLOOD PRESSURE: 93 MMHG

## 2021-01-29 DIAGNOSIS — C50.411: ICD-10-CM

## 2021-01-29 DIAGNOSIS — D70.1: ICD-10-CM

## 2021-01-29 DIAGNOSIS — Z76.89: Primary | ICD-10-CM

## 2021-01-29 PROCEDURE — 96372 THER/PROPH/DIAG INJ SC/IM: CPT
